# Patient Record
Sex: FEMALE | Race: WHITE | NOT HISPANIC OR LATINO | Employment: UNEMPLOYED | URBAN - NONMETROPOLITAN AREA
[De-identification: names, ages, dates, MRNs, and addresses within clinical notes are randomized per-mention and may not be internally consistent; named-entity substitution may affect disease eponyms.]

---

## 2018-10-13 ENCOUNTER — APPOINTMENT (OUTPATIENT)
Dept: CT IMAGING | Facility: HOSPITAL | Age: 53
End: 2018-10-13

## 2018-10-13 ENCOUNTER — APPOINTMENT (OUTPATIENT)
Dept: GENERAL RADIOLOGY | Facility: HOSPITAL | Age: 53
End: 2018-10-13

## 2018-10-13 ENCOUNTER — HOSPITAL ENCOUNTER (INPATIENT)
Facility: HOSPITAL | Age: 53
LOS: 1 days | Discharge: LEFT BEFORE DISCHARGE | End: 2018-10-13
Attending: EMERGENCY MEDICINE | Admitting: INTERNAL MEDICINE

## 2018-10-13 VITALS
WEIGHT: 160 LBS | RESPIRATION RATE: 16 BRPM | SYSTOLIC BLOOD PRESSURE: 104 MMHG | BODY MASS INDEX: 27.31 KG/M2 | HEART RATE: 101 BPM | TEMPERATURE: 99.7 F | DIASTOLIC BLOOD PRESSURE: 65 MMHG | OXYGEN SATURATION: 93 % | HEIGHT: 64 IN

## 2018-10-13 DIAGNOSIS — R40.0 SOMNOLENCE: ICD-10-CM

## 2018-10-13 DIAGNOSIS — R10.9 COMBINED ABDOMINAL PAIN, VOMITING, AND DIARRHEA: Primary | ICD-10-CM

## 2018-10-13 DIAGNOSIS — A41.9 SEPSIS, DUE TO UNSPECIFIED ORGANISM: ICD-10-CM

## 2018-10-13 DIAGNOSIS — R19.7 COMBINED ABDOMINAL PAIN, VOMITING, AND DIARRHEA: Primary | ICD-10-CM

## 2018-10-13 DIAGNOSIS — R11.10 COMBINED ABDOMINAL PAIN, VOMITING, AND DIARRHEA: Primary | ICD-10-CM

## 2018-10-13 LAB
6-ACETYL MORPHINE: NEGATIVE
A-A DO2: 40.4 MMHG (ref 0–300)
ALBUMIN SERPL-MCNC: 4 G/DL (ref 3.5–5)
ALBUMIN/GLOB SERPL: 1.3 G/DL (ref 1.5–2.5)
ALP SERPL-CCNC: 66 U/L (ref 35–104)
ALT SERPL W P-5'-P-CCNC: 24 U/L (ref 10–36)
AMMONIA BLD-SCNC: 18 UMOL/L (ref 11–51)
AMPHET+METHAMPHET UR QL: NEGATIVE
AMYLASE SERPL-CCNC: 42 U/L (ref 28–100)
ANION GAP SERPL CALCULATED.3IONS-SCNC: 5.4 MMOL/L (ref 3.6–11.2)
ARTERIAL PATENCY WRIST A: ABNORMAL
AST SERPL-CCNC: 22 U/L (ref 10–30)
ATMOSPHERIC PRESS: 729 MMHG
BACTERIA UR QL AUTO: ABNORMAL /HPF
BARBITURATES UR QL SCN: NEGATIVE
BASE EXCESS BLDA CALC-SCNC: 0.7 MMOL/L
BASOPHILS # BLD AUTO: 0.02 10*3/MM3 (ref 0–0.3)
BASOPHILS NFR BLD AUTO: 0.2 % (ref 0–2)
BDY SITE: ABNORMAL
BENZODIAZ UR QL SCN: NEGATIVE
BILIRUB SERPL-MCNC: 0.2 MG/DL (ref 0.2–1.8)
BILIRUB UR QL STRIP: NEGATIVE
BODY TEMPERATURE: 98.6 C
BUN BLD-MCNC: 20 MG/DL (ref 7–21)
BUN/CREAT SERPL: 20 (ref 7–25)
BUPRENORPHINE SERPL-MCNC: NEGATIVE NG/ML
CALCIUM SPEC-SCNC: 9.4 MG/DL (ref 7.7–10)
CANNABINOIDS SERPL QL: NEGATIVE
CHLORIDE SERPL-SCNC: 104 MMOL/L (ref 99–112)
CLARITY UR: CLEAR
CO2 SERPL-SCNC: 27.6 MMOL/L (ref 24.3–31.9)
COCAINE UR QL: NEGATIVE
COHGB MFR BLD: 3.7 % (ref 0–5)
COLOR UR: YELLOW
CREAT BLD-MCNC: 1 MG/DL (ref 0.43–1.29)
CRP SERPL-MCNC: 1.61 MG/DL (ref 0–0.99)
D-LACTATE SERPL-SCNC: 1.2 MMOL/L (ref 0.5–2)
DEPRECATED RDW RBC AUTO: 40.9 FL (ref 37–54)
EOSINOPHIL # BLD AUTO: 0.05 10*3/MM3 (ref 0–0.7)
EOSINOPHIL NFR BLD AUTO: 0.4 % (ref 0–5)
ERYTHROCYTE [DISTWIDTH] IN BLOOD BY AUTOMATED COUNT: 12.6 % (ref 11.5–14.5)
ETHANOL BLD-MCNC: <10 MG/DL
ETHANOL UR QL: <0.01 %
GFR SERPL CREATININE-BSD FRML MDRD: 58 ML/MIN/1.73
GLOBULIN UR ELPH-MCNC: 3.2 GM/DL
GLUCOSE BLD-MCNC: 158 MG/DL (ref 70–110)
GLUCOSE BLDC GLUCOMTR-MCNC: 119 MG/DL (ref 70–130)
GLUCOSE BLDC GLUCOMTR-MCNC: 144 MG/DL (ref 70–130)
GLUCOSE UR STRIP-MCNC: NEGATIVE MG/DL
HAV IGM SERPL QL IA: NORMAL
HBV CORE IGM SERPL QL IA: NORMAL
HBV SURFACE AG SERPL QL IA: NORMAL
HCO3 BLDA-SCNC: 25.7 MMOL/L (ref 22–26)
HCT VFR BLD AUTO: 48.3 % (ref 37–47)
HCT VFR BLD CALC: 42 % (ref 37–47)
HCV AB SER DONR QL: NORMAL
HGB BLD-MCNC: 15.6 G/DL (ref 12–16)
HGB BLDA-MCNC: 14.4 G/DL (ref 12–16)
HGB UR QL STRIP.AUTO: ABNORMAL
HOROWITZ INDEX BLD+IHG-RTO: 21 %
HYALINE CASTS UR QL AUTO: ABNORMAL /LPF
IMM GRANULOCYTES # BLD: 0.03 10*3/MM3 (ref 0–0.03)
IMM GRANULOCYTES NFR BLD: 0.2 % (ref 0–0.5)
KETONES UR QL STRIP: NEGATIVE
LEUKOCYTE ESTERASE UR QL STRIP.AUTO: NEGATIVE
LIPASE SERPL-CCNC: 19 U/L (ref 13–60)
LYMPHOCYTES # BLD AUTO: 1.47 10*3/MM3 (ref 1–3)
LYMPHOCYTES NFR BLD AUTO: 11.4 % (ref 21–51)
MAGNESIUM SERPL-MCNC: 1.8 MG/DL (ref 1.7–2.6)
MCH RBC QN AUTO: 29.3 PG (ref 27–33)
MCHC RBC AUTO-ENTMCNC: 32.3 G/DL (ref 33–37)
MCV RBC AUTO: 90.8 FL (ref 80–94)
METHADONE UR QL SCN: NEGATIVE
METHGB BLD QL: 0.3 % (ref 0–3)
MODALITY: ABNORMAL
MONOCYTES # BLD AUTO: 0.63 10*3/MM3 (ref 0.1–0.9)
MONOCYTES NFR BLD AUTO: 4.9 % (ref 0–10)
NEUTROPHILS # BLD AUTO: 10.64 10*3/MM3 (ref 1.4–6.5)
NEUTROPHILS NFR BLD AUTO: 82.9 % (ref 30–70)
NITRITE UR QL STRIP: NEGATIVE
OPIATES UR QL: POSITIVE
OSMOLALITY SERPL CALC.SUM OF ELEC: 279.7 MOSM/KG (ref 273–305)
OXYCODONE UR QL SCN: NEGATIVE
OXYHGB MFR BLDV: 82.6 % (ref 85–100)
PCO2 BLDA: 42.6 MM HG (ref 35–45)
PCO2 TEMP ADJ BLD: ABNORMAL MM HG (ref 35–45)
PCP UR QL SCN: NEGATIVE
PH BLDA: 7.4 PH UNITS (ref 7.35–7.45)
PH UR STRIP.AUTO: <=5 [PH] (ref 5–8)
PH, TEMP CORRECTED: ABNORMAL PH UNITS (ref 7.35–7.45)
PLATELET # BLD AUTO: 308 10*3/MM3 (ref 130–400)
PMV BLD AUTO: 11.1 FL (ref 6–10)
PO2 BLDA: 51.8 MM HG (ref 80–100)
PO2 TEMP ADJ BLD: ABNORMAL MM HG (ref 83–108)
POTASSIUM BLD-SCNC: 5 MMOL/L (ref 3.5–5.3)
PROT SERPL-MCNC: 7.2 G/DL (ref 6–8)
PROT UR QL STRIP: ABNORMAL
RBC # BLD AUTO: 5.32 10*6/MM3 (ref 4.2–5.4)
RBC # UR: ABNORMAL /HPF
REF LAB TEST METHOD: ABNORMAL
SAO2 % BLDCOA: 86 % (ref 90–100)
SODIUM BLD-SCNC: 137 MMOL/L (ref 135–153)
SP GR UR STRIP: 1.02 (ref 1–1.03)
SQUAMOUS #/AREA URNS HPF: ABNORMAL /HPF
TROPONIN I SERPL-MCNC: <0.006 NG/ML
UROBILINOGEN UR QL STRIP: ABNORMAL
WBC NRBC COR # BLD: 12.84 10*3/MM3 (ref 4.5–12.5)
WBC UR QL AUTO: ABNORMAL /HPF

## 2018-10-13 PROCEDURE — 70450 CT HEAD/BRAIN W/O DYE: CPT

## 2018-10-13 PROCEDURE — 80307 DRUG TEST PRSMV CHEM ANLYZR: CPT | Performed by: PHYSICIAN ASSISTANT

## 2018-10-13 PROCEDURE — 82375 ASSAY CARBOXYHB QUANT: CPT | Performed by: PHYSICIAN ASSISTANT

## 2018-10-13 PROCEDURE — 93005 ELECTROCARDIOGRAM TRACING: CPT | Performed by: PHYSICIAN ASSISTANT

## 2018-10-13 PROCEDURE — 86140 C-REACTIVE PROTEIN: CPT | Performed by: PHYSICIAN ASSISTANT

## 2018-10-13 PROCEDURE — 70450 CT HEAD/BRAIN W/O DYE: CPT | Performed by: RADIOLOGY

## 2018-10-13 PROCEDURE — 87040 BLOOD CULTURE FOR BACTERIA: CPT | Performed by: PHYSICIAN ASSISTANT

## 2018-10-13 PROCEDURE — 71045 X-RAY EXAM CHEST 1 VIEW: CPT | Performed by: RADIOLOGY

## 2018-10-13 PROCEDURE — 25010000002 CEFEPIME 2 G/NS 100 ML SOLUTION: Performed by: PHYSICIAN ASSISTANT

## 2018-10-13 PROCEDURE — 36600 WITHDRAWAL OF ARTERIAL BLOOD: CPT | Performed by: PHYSICIAN ASSISTANT

## 2018-10-13 PROCEDURE — 83050 HGB METHEMOGLOBIN QUAN: CPT | Performed by: PHYSICIAN ASSISTANT

## 2018-10-13 PROCEDURE — 80053 COMPREHEN METABOLIC PANEL: CPT | Performed by: PHYSICIAN ASSISTANT

## 2018-10-13 PROCEDURE — 25010000002 ONDANSETRON PER 1 MG: Performed by: PHYSICIAN ASSISTANT

## 2018-10-13 PROCEDURE — 83605 ASSAY OF LACTIC ACID: CPT | Performed by: PHYSICIAN ASSISTANT

## 2018-10-13 PROCEDURE — 82140 ASSAY OF AMMONIA: CPT | Performed by: PHYSICIAN ASSISTANT

## 2018-10-13 PROCEDURE — 83690 ASSAY OF LIPASE: CPT | Performed by: PHYSICIAN ASSISTANT

## 2018-10-13 PROCEDURE — 25010000002 IOPAMIDOL 61 % SOLUTION: Performed by: EMERGENCY MEDICINE

## 2018-10-13 PROCEDURE — 84484 ASSAY OF TROPONIN QUANT: CPT | Performed by: PHYSICIAN ASSISTANT

## 2018-10-13 PROCEDURE — 82150 ASSAY OF AMYLASE: CPT | Performed by: PHYSICIAN ASSISTANT

## 2018-10-13 PROCEDURE — 83735 ASSAY OF MAGNESIUM: CPT | Performed by: PHYSICIAN ASSISTANT

## 2018-10-13 PROCEDURE — 82805 BLOOD GASES W/O2 SATURATION: CPT | Performed by: PHYSICIAN ASSISTANT

## 2018-10-13 PROCEDURE — 81001 URINALYSIS AUTO W/SCOPE: CPT | Performed by: PHYSICIAN ASSISTANT

## 2018-10-13 PROCEDURE — 80074 ACUTE HEPATITIS PANEL: CPT | Performed by: PHYSICIAN ASSISTANT

## 2018-10-13 PROCEDURE — 99285 EMERGENCY DEPT VISIT HI MDM: CPT

## 2018-10-13 PROCEDURE — 74177 CT ABD & PELVIS W/CONTRAST: CPT

## 2018-10-13 PROCEDURE — 82962 GLUCOSE BLOOD TEST: CPT

## 2018-10-13 PROCEDURE — 74177 CT ABD & PELVIS W/CONTRAST: CPT | Performed by: RADIOLOGY

## 2018-10-13 PROCEDURE — 85025 COMPLETE CBC W/AUTO DIFF WBC: CPT | Performed by: PHYSICIAN ASSISTANT

## 2018-10-13 PROCEDURE — 71045 X-RAY EXAM CHEST 1 VIEW: CPT

## 2018-10-13 RX ORDER — LEVOTHYROXINE SODIUM 0.2 MG/1
200 TABLET ORAL DAILY
COMMUNITY

## 2018-10-13 RX ORDER — SODIUM CHLORIDE 0.9 % (FLUSH) 0.9 %
10 SYRINGE (ML) INJECTION AS NEEDED
Status: DISCONTINUED | OUTPATIENT
Start: 2018-10-13 | End: 2018-10-13 | Stop reason: HOSPADM

## 2018-10-13 RX ORDER — HEPARIN SODIUM 5000 [USP'U]/ML
5000 INJECTION, SOLUTION INTRAVENOUS; SUBCUTANEOUS EVERY 12 HOURS SCHEDULED
Status: CANCELLED | OUTPATIENT
Start: 2018-10-13

## 2018-10-13 RX ORDER — NITROGLYCERIN 0.4 MG/1
0.4 TABLET SUBLINGUAL
Status: CANCELLED | OUTPATIENT
Start: 2018-10-13

## 2018-10-13 RX ORDER — ACETAMINOPHEN 500 MG
1000 TABLET ORAL ONCE
Status: COMPLETED | OUTPATIENT
Start: 2018-10-13 | End: 2018-10-13

## 2018-10-13 RX ORDER — CHOLECALCIFEROL (VITAMIN D3) 125 MCG
TABLET ORAL
COMMUNITY

## 2018-10-13 RX ORDER — CLOPIDOGREL BISULFATE 75 MG/1
75 TABLET ORAL DAILY
COMMUNITY

## 2018-10-13 RX ORDER — POTASSIUM CHLORIDE 750 MG/1
10 TABLET, FILM COATED, EXTENDED RELEASE ORAL
COMMUNITY

## 2018-10-13 RX ORDER — FUROSEMIDE 20 MG/1
20 TABLET ORAL
COMMUNITY

## 2018-10-13 RX ORDER — ESCITALOPRAM OXALATE 10 MG/1
30 TABLET ORAL DAILY
COMMUNITY

## 2018-10-13 RX ORDER — SODIUM CHLORIDE 0.9 % (FLUSH) 0.9 %
3 SYRINGE (ML) INJECTION EVERY 12 HOURS SCHEDULED
Status: CANCELLED | OUTPATIENT
Start: 2018-10-13

## 2018-10-13 RX ORDER — ONDANSETRON 2 MG/ML
4 INJECTION INTRAMUSCULAR; INTRAVENOUS ONCE
Status: COMPLETED | OUTPATIENT
Start: 2018-10-13 | End: 2018-10-13

## 2018-10-13 RX ORDER — METRONIDAZOLE 500 MG/1
500 TABLET ORAL 3 TIMES DAILY
Qty: 30 TABLET | Refills: 0 | Status: SHIPPED | OUTPATIENT
Start: 2018-10-13 | End: 2018-10-23

## 2018-10-13 RX ORDER — ONDANSETRON 4 MG/1
4 TABLET, ORALLY DISINTEGRATING ORAL EVERY 6 HOURS PRN
Qty: 10 TABLET | Refills: 0 | Status: SHIPPED | OUTPATIENT
Start: 2018-10-13

## 2018-10-13 RX ORDER — SODIUM CHLORIDE 0.9 % (FLUSH) 0.9 %
3-10 SYRINGE (ML) INJECTION AS NEEDED
Status: CANCELLED | OUTPATIENT
Start: 2018-10-13

## 2018-10-13 RX ADMIN — IOPAMIDOL 100 ML: 612 INJECTION, SOLUTION INTRAVENOUS at 14:12

## 2018-10-13 RX ADMIN — METRONIDAZOLE 500 MG: 500 INJECTION, SOLUTION INTRAVENOUS at 15:35

## 2018-10-13 RX ADMIN — ONDANSETRON 4 MG: 2 INJECTION, SOLUTION INTRAMUSCULAR; INTRAVENOUS at 12:34

## 2018-10-13 RX ADMIN — SODIUM CHLORIDE 1000 ML: 9 INJECTION, SOLUTION INTRAVENOUS at 12:34

## 2018-10-13 RX ADMIN — CEFEPIME 2 G: 2 INJECTION, POWDER, FOR SOLUTION INTRAVENOUS at 14:49

## 2018-10-13 RX ADMIN — SODIUM CHLORIDE 1000 ML: 9 INJECTION, SOLUTION INTRAVENOUS at 14:20

## 2018-10-13 RX ADMIN — ACETAMINOPHEN 1000 MG: 500 TABLET, FILM COATED ORAL at 14:05

## 2018-10-13 NOTE — ED NOTES
Pt is alert and oriented. Sitting up in bed. States that she needs her pain medicine neurontin and morphine. States that she does not want to be admitted. Notified CONRAD Youngblood.      Keron, Jj Nanda, RN  10/13/18 4437

## 2018-10-13 NOTE — ED NOTES
Called [facility] in [facility] for a med list. They are faxing it over now.     Marleny Stark  10/13/18 2006

## 2018-10-13 NOTE — ED PROVIDER NOTES
Subjective   52-year-old female presents to the ED today for vomiting and diarrhea.  She states the diarrhea started yesterday evening.  She states she has had copious amounts of watery diarrhea.  She denies any blood in her stool.  She states she started vomiting about 5 AM today.  She states she feels very weak and fatigued and has no energy.  She is complaining of a mid abdominal pain that is constant.  She describes as dull and aching.  She denies any known fever.  She denies any known sick contacts.  She denies any recent travel out of the country.  She is visiting this area from [place].  She states she has been attending the [employer] festival.  She states she ate some dumplings 2 nights ago and ate a corn dog yesterday evening.  She denies any urinary symptoms.  She states she was able to take her Neurontin and Ultram this morning has not taken any of her other regular medicines.  She denies any drug use.  She drinks alcohol socially but nothing recently.  She first presented to urgent care for an evaluation but they sent her here.        History provided by:  Patient and relative (daughter)  Vomiting   The primary symptoms include fatigue, abdominal pain, nausea, vomiting and diarrhea. Primary symptoms do not include fever. The illness began yesterday. The onset was sudden. The problem has not changed since onset.  The illness is also significant for anorexia. The illness does not include chills. Associated medical issues do not include alcohol abuse.       Review of Systems   Constitutional: Positive for appetite change and fatigue. Negative for chills and fever.   HENT: Negative.    Eyes: Negative.    Respiratory: Negative.    Cardiovascular: Negative.    Gastrointestinal: Positive for abdominal pain, anorexia, diarrhea, nausea and vomiting. Negative for abdominal distention and blood in stool.   Genitourinary: Negative.    Musculoskeletal: Negative.    Skin: Negative.    Neurological: Positive for  weakness.   Psychiatric/Behavioral: Negative.    All other systems reviewed and are negative.      Past Medical History:   Diagnosis Date   • Carotid stenosis, bilateral    • COPD (chronic obstructive pulmonary disease) (CMS/HCC)    • Coronary artery disease    • Hyperlipidemia    • Hypertension    • Hypothyroidism    • Hypothyroidism    • Splenic infarct    • Type 2 diabetes mellitus (CMS/HCC)        Allergies   Allergen Reactions   • Adhesive Tape Dermatitis   • Lisinopril Cough       Past Surgical History:   Procedure Laterality Date   • ADENOIDECTOMY     • CERVICAL LYMPH NODE BIOPSY/EXCISION     • CORONARY ARTERY BYPASS GRAFT  2011   • TUBAL ABDOMINAL LIGATION         History reviewed. No pertinent family history.    Social History     Social History   • Marital status: Single     Social History Main Topics   • Smoking status: Current Every Day Smoker     Packs/day: 0.50   • Alcohol use No   • Drug use: No     Other Topics Concern   • Not on file           Objective   Physical Exam   Constitutional: She appears well-developed and well-nourished.   HENT:   Head: Normocephalic and atraumatic.   Right Ear: External ear normal.   Left Ear: External ear normal.   Nose: Nose normal.   Mouth/Throat: Mucous membranes are dry.   Eyes: Pupils are equal, round, and reactive to light. Conjunctivae and EOM are normal.   Neck: Normal range of motion. Neck supple.   Cardiovascular: Regular rhythm, normal heart sounds and intact distal pulses.  Tachycardia present.    Pulmonary/Chest: Effort normal and breath sounds normal. No respiratory distress. She has no wheezes. She exhibits no tenderness.   Abdominal: Soft. Bowel sounds are increased. There is no tenderness. There is no rebound and no guarding.   Musculoskeletal: Normal range of motion.   Neurological: No cranial nerve deficit or sensory deficit. She exhibits normal muscle tone. Coordination normal.   Patient is quite somnolent.  She can answer questions but she has  slurred speech and mumbles when she talks.  If she is not being stimulated then she immediately falls back to sleep.   Skin: Skin is warm and dry. Capillary refill takes less than 2 seconds. She is not diaphoretic.   Psychiatric: Her speech is slurred. She is slowed.   Nursing note and vitals reviewed.      Procedures           ED Course  ED Course as of Oct 13 1643   Sat Oct 13, 2018   1437 14:26 - sinus tachycardia, rate of 116, nonspecific ST abnormality, no acute ischemia, reviewed by Dr. South ECG 12 Lead []   1459 No acute findings on CXR per Dr. Gifford  [AH]   1500 Patient has not had any vomiting or diarrhea while in the ED.  She states she is not feeling any better.  Patient is quite drowsy.  When I ask her why she states it is because she has been up all night with her symptoms and is very tired.  Will discuss with hospitalist service for admission.  [AH]   1519 I discussed with Dr. Dykes who will admit to PCU  [AH]   1608 Patient is now awake and alert, sitting up in the bed.  She was about to go up to PCU and she states she now does not want to be admitted to the hospital.  She states she wants to go back home to [place].  She states they have already checked out of her hotel and her daughter won't have anywhere to stay.  She also states she wants to get her morphine and Neurontin while she is up on the floor.  I discussed with her that the continuation of her home medications will be at the discretion of the admitting physician.  I have discussed the risks of leaving and she is adamant that she will not be admitted.  She will sign out AMA.  [AH]   1617 Dr. Dykes has been notified that patient is leaving AMA  []      ED Course User Index  [AH] Rylie Smith, PA                  MDM  Number of Diagnoses or Management Options  Combined abdominal pain, vomiting, and diarrhea:   Sepsis, due to unspecified organism (CMS/[facility]):   Somnolence:      Amount and/or Complexity of Data Reviewed  Clinical lab  tests: reviewed  Tests in the radiology section of CPT®: reviewed  Tests in the medicine section of CPT®: reviewed  Discuss the patient with other providers: yes    Patient Progress  Patient progress: (Signed out AMA)        Final diagnoses:   Combined abdominal pain, vomiting, and diarrhea   Sepsis, due to unspecified organism (CMS/[facility])   Somnolence            Rylie Smith PA  10/13/18 3718

## 2018-10-18 LAB
BACTERIA SPEC AEROBE CULT: NORMAL
BACTERIA SPEC AEROBE CULT: NORMAL

## 2019-11-27 ENCOUNTER — HOSPITAL ENCOUNTER (OUTPATIENT)
Dept: OTHER | Facility: HOSPITAL | Age: 54
Discharge: HOME | End: 2019-11-27

## 2019-11-27 LAB
BUN SERPL-MCNC: 21 MG/DL (ref 5–25)
CREAT UR-MCNC: 0.94 MG/DL (ref 0.5–0.9)
